# Patient Record
Sex: FEMALE | URBAN - METROPOLITAN AREA
[De-identification: names, ages, dates, MRNs, and addresses within clinical notes are randomized per-mention and may not be internally consistent; named-entity substitution may affect disease eponyms.]

---

## 2021-08-03 ENCOUNTER — OFFICE VISIT (OUTPATIENT)
Dept: URBAN - METROPOLITAN AREA CLINIC 89 | Facility: CLINIC | Age: 46
End: 2021-08-03
Payer: COMMERCIAL

## 2021-08-03 DIAGNOSIS — H52.4 PRESBYOPIA: ICD-10-CM

## 2021-08-03 DIAGNOSIS — H11.152 PINGUECULA, LEFT EYE: ICD-10-CM

## 2021-08-03 DIAGNOSIS — H11.011 AMYLOID PTERYGIUM OF RIGHT EYE: ICD-10-CM

## 2021-08-03 DIAGNOSIS — H25.13 AGE-RELATED NUCLEAR CATARACT, BILATERAL: Primary | ICD-10-CM

## 2021-08-03 PROCEDURE — 92004 COMPRE OPH EXAM NEW PT 1/>: CPT

## 2021-08-03 RX ORDER — GABAPENTIN 600 MG/1
600 MG TABLET, FILM COATED ORAL
Qty: 0 | Refills: 0 | Status: INACTIVE
Start: 2021-08-03 | End: 2021-08-03

## 2021-08-03 RX ORDER — GABAPENTIN 600 MG/1
600 MG TABLET, FILM COATED ORAL
Qty: 0 | Refills: 0 | Status: ACTIVE
Start: 2021-08-03

## 2021-08-03 RX ORDER — NAPROXEN SODIUM 220 MG
220 MG TABLET ORAL
Qty: 0 | Refills: 0 | Status: ACTIVE
Start: 2021-08-03

## 2021-08-03 NOTE — IMPRESSION/PLAN
Impression: Amyloid pterygium of right eye: H11.011. Plan: Discussed diagnosis in detail with patient. Advised patient of condition. Advised UV protection and artifical tears OU. Sample Refresh given to use QID OU.

## 2021-08-03 NOTE — IMPRESSION/PLAN
Impression: Presbyopia: H52.4. Plan: Discussed diagnosis in detail with patient. Advised patient of condition. New glasses Rx was given today.